# Patient Record
Sex: FEMALE | Race: WHITE | NOT HISPANIC OR LATINO | Employment: FULL TIME | URBAN - NONMETROPOLITAN AREA
[De-identification: names, ages, dates, MRNs, and addresses within clinical notes are randomized per-mention and may not be internally consistent; named-entity substitution may affect disease eponyms.]

---

## 2021-03-18 ENCOUNTER — IMMUNIZATION (OUTPATIENT)
Dept: VACCINE CLINIC | Facility: HOSPITAL | Age: 46
End: 2021-03-18

## 2021-03-18 PROCEDURE — 91300 HC SARSCOV02 VAC 30MCG/0.3ML IM: CPT | Performed by: THORACIC SURGERY (CARDIOTHORACIC VASCULAR SURGERY)

## 2021-03-18 PROCEDURE — 0001A: CPT | Performed by: THORACIC SURGERY (CARDIOTHORACIC VASCULAR SURGERY)

## 2021-04-08 ENCOUNTER — IMMUNIZATION (OUTPATIENT)
Dept: VACCINE CLINIC | Facility: HOSPITAL | Age: 46
End: 2021-04-08

## 2021-04-08 PROCEDURE — 91300 HC SARSCOV02 VAC 30MCG/0.3ML IM: CPT | Performed by: NURSE PRACTITIONER

## 2021-04-08 PROCEDURE — 0002A: CPT | Performed by: NURSE PRACTITIONER

## 2024-09-17 ENCOUNTER — OFFICE VISIT (OUTPATIENT)
Dept: OBSTETRICS AND GYNECOLOGY | Facility: CLINIC | Age: 49
End: 2024-09-17
Payer: COMMERCIAL

## 2024-09-17 VITALS
SYSTOLIC BLOOD PRESSURE: 126 MMHG | HEIGHT: 65 IN | WEIGHT: 142 LBS | DIASTOLIC BLOOD PRESSURE: 89 MMHG | BODY MASS INDEX: 23.66 KG/M2 | HEART RATE: 79 BPM

## 2024-09-17 DIAGNOSIS — Z01.419 ENCOUNTER FOR GYNECOLOGICAL EXAMINATION WITHOUT ABNORMAL FINDING: Primary | ICD-10-CM

## 2024-09-17 DIAGNOSIS — N90.89 VULVAR LESION: ICD-10-CM

## 2024-09-17 RX ORDER — PERFLUOROHEXYLOCTANE 1 MG/MG
SOLUTION OPHTHALMIC
COMMUNITY
Start: 2024-09-05

## 2024-09-17 RX ORDER — DOXYCYCLINE 100 MG/1
100 CAPSULE ORAL 2 TIMES DAILY
Qty: 20 CAPSULE | Refills: 0 | Status: SHIPPED | OUTPATIENT
Start: 2024-09-17 | End: 2024-09-27

## 2024-09-17 RX ORDER — FLURANDRENOLIDE 4 UG/CM2
TAPE TOPICAL
COMMUNITY
Start: 2024-08-29

## 2024-09-20 ENCOUNTER — PATIENT ROUNDING (BHMG ONLY) (OUTPATIENT)
Dept: OBSTETRICS AND GYNECOLOGY | Facility: CLINIC | Age: 49
End: 2024-09-20
Payer: COMMERCIAL

## 2024-09-20 ENCOUNTER — PATIENT MESSAGE (OUTPATIENT)
Dept: OBSTETRICS AND GYNECOLOGY | Facility: CLINIC | Age: 49
End: 2024-09-20
Payer: COMMERCIAL

## 2024-09-23 LAB
CYTOLOGIST CVX/VAG CYTO: NORMAL
CYTOLOGY CVX/VAG DOC CYTO: NORMAL
CYTOLOGY CVX/VAG DOC THIN PREP: NORMAL
DX ICD CODE: NORMAL
HPV I/H RISK 4 DNA CVX QL PROBE+SIG AMP: NEGATIVE
Lab: NORMAL
OTHER STN SPEC: NORMAL
STAT OF ADQ CVX/VAG CYTO-IMP: NORMAL

## 2024-10-02 ENCOUNTER — PROCEDURE VISIT (OUTPATIENT)
Dept: OBSTETRICS AND GYNECOLOGY | Facility: CLINIC | Age: 49
End: 2024-10-02
Payer: COMMERCIAL

## 2024-10-02 DIAGNOSIS — Z12.31 VISIT FOR SCREENING MAMMOGRAM: Primary | ICD-10-CM

## 2025-02-02 NOTE — PROGRESS NOTES
"GYN VISIT    Chief Complaint   Patient presents with    Follow-up     Here today for abnormal bleeding for 3 weeks and breast tenderness.        TOMI Horvath is a 49 y.o.  who presents with abnormal uterine bleeding and mastalgia. Her last mammogram was BI-RADS 2 in . She does report drinking caffeine. She reports that a few weeks ago she had menses for around 14 days. Then she had around 1 week with no bleeding, followed by another week of bleeding. She has not had anything like this previously. She does have a ParaGard IUD in place, which she has not had an issue with thus far.     LMP: No LMP recorded. Patient has had an implant.    Past Medical History:   Diagnosis Date    Migraine     Ovarian cyst     PONV (postoperative nausea and vomiting)     Urinary tract infection     Varicella         Past Surgical History:   Procedure Laterality Date    D & C WITH SUCTION      FOOT SURGERY Right     TONSILLECTOMY      WISDOM TOOTH EXTRACTION          Review of Systems   Constitutional:  Negative for chills, diaphoresis, fatigue and fever.   Genitourinary:  Positive for breast pain and menstrual problem. Negative for decreased urine volume, difficulty urinating, dyspareunia, flank pain, frequency, genital sores, hematuria, urgency, urinary incontinence, vaginal bleeding, vaginal discharge and vaginal pain.   Hematological:  Negative for adenopathy.   All other systems reviewed and are negative.      OBJECTIVE     Vitals:    25 1306   BP: 120/80   Weight: 64.4 kg (142 lb)   Height: 165.1 cm (65\")        Physical Exam  Constitutional:       General: She is awake.      Appearance: Normal appearance. She is well-developed and well-groomed.   Genitourinary:      Genitourinary Comments: Breasts are dense bilaterally without any palpable masses.       IUD strings visualized (Appropriate length and placement.).   Breasts:     Right: Breast implant present. No mass.      Left: Breast implant " present. No mass.   HENT:      Head: Normocephalic and atraumatic.   Pulmonary:      Effort: Pulmonary effort is normal.   Musculoskeletal:      Cervical back: Normal range of motion.   Lymphadenopathy:      Upper Body:      Right upper body: No supraclavicular or axillary adenopathy.      Left upper body: No supraclavicular or axillary adenopathy.   Neurological:      General: No focal deficit present.      Mental Status: She is alert and oriented to person, place, and time.   Skin:     General: Skin is warm and dry.   Psychiatric:         Mood and Affect: Mood normal.         Behavior: Behavior normal. Behavior is cooperative.   Vitals reviewed.         ASSESSMENT/PLAN    Diagnoses and all orders for this visit:    1. Abnormal uterine bleeding (AUB) (Primary)  -     POC Urinalysis Dipstick  -     POC Pregnancy, Urine  -     US Non-ob Transvaginal; Future  -     NuSwab VG+ - Swab, Vagina    2. Mastalgia  -     vitamin E 400 UNIT capsule; Take 1 capsule by mouth Daily.  Dispense: 30 capsule; Refill: 11  -     Mammo Diagnostic Digital Tomosynthesis Bilateral With CAD; Future  -     US Breast Right Limited; Future  -     US Breast Left Limited; Future    Education provided on caffeine and its impact on breast tissue. Recommended reducing caffeine if possible. Imaging ordered for breasts.   She will return to care for ultrasound and office visit to verify IUD placement.   Briefly discussed other options, such as uterine ablation.     Return in about 2 weeks (around 2/17/2025) for ultrasound + office visit.    I spent 30 minutes caring for Yuliet on this date of service. This time includes time spent by me in the following activities: preparing for the visit, reviewing tests, performing a medically appropriate examination and/or evaluation, counseling and educating the patient/family/caregiver, referring and communicating with other health care professionals, documenting information in the medical record, independently  interpreting results and communicating that information with the patient/family/caregiver, care coordination, ordering medications, ordering test(s), ordering procedure(s), obtaining a separately obtained history, and reviewing a separately obtained history.    Danica Reynolds CNM  2/3/2025  13:51 EST

## 2025-02-03 ENCOUNTER — OFFICE VISIT (OUTPATIENT)
Dept: OBSTETRICS AND GYNECOLOGY | Facility: CLINIC | Age: 50
End: 2025-02-03
Payer: COMMERCIAL

## 2025-02-03 VITALS
SYSTOLIC BLOOD PRESSURE: 120 MMHG | DIASTOLIC BLOOD PRESSURE: 80 MMHG | HEIGHT: 65 IN | BODY MASS INDEX: 23.66 KG/M2 | WEIGHT: 142 LBS

## 2025-02-03 DIAGNOSIS — N64.4 MASTALGIA: ICD-10-CM

## 2025-02-03 DIAGNOSIS — N93.9 ABNORMAL UTERINE BLEEDING (AUB): Primary | ICD-10-CM

## 2025-02-03 LAB
B-HCG UR QL: NEGATIVE
BILIRUB BLD-MCNC: NEGATIVE MG/DL
EXPIRATION DATE: NORMAL
GLUCOSE UR STRIP-MCNC: NEGATIVE MG/DL
INTERNAL NEGATIVE CONTROL: NEGATIVE
INTERNAL POSITIVE CONTROL: POSITIVE
KETONES UR QL: NEGATIVE
LEUKOCYTE EST, POC: NEGATIVE
Lab: NORMAL
NITRITE UR-MCNC: NEGATIVE MG/ML
PROT UR STRIP-MCNC: NEGATIVE MG/DL
RBC # UR STRIP: NEGATIVE /UL

## 2025-02-03 RX ORDER — VITAMIN E 268 MG
400 CAPSULE ORAL DAILY
Qty: 30 CAPSULE | Refills: 11 | Status: SHIPPED | OUTPATIENT
Start: 2025-02-03

## 2025-02-06 LAB
A VAGINAE DNA VAG QL NAA+PROBE: NORMAL SCORE
BVAB2 DNA VAG QL NAA+PROBE: NORMAL SCORE
C ALBICANS DNA VAG QL NAA+PROBE: NEGATIVE
C GLABRATA DNA VAG QL NAA+PROBE: NEGATIVE
C TRACH DNA SPEC QL NAA+PROBE: NEGATIVE
MEGA1 DNA VAG QL NAA+PROBE: NORMAL SCORE
N GONORRHOEA DNA VAG QL NAA+PROBE: NEGATIVE
T VAGINALIS DNA VAG QL NAA+PROBE: NEGATIVE

## 2025-02-07 DIAGNOSIS — N64.4 MASTALGIA: Primary | ICD-10-CM

## 2025-02-11 ENCOUNTER — TELEPHONE (OUTPATIENT)
Dept: OBSTETRICS AND GYNECOLOGY | Facility: CLINIC | Age: 50
End: 2025-02-11
Payer: COMMERCIAL

## 2025-02-11 NOTE — TELEPHONE ENCOUNTER
Called pt back to explain what appts were needed.  She scheduled at [facility] for 2/18/25 @ 145 & 215pm for DX Bilat Mammo & Bilat Limited US.

## 2025-02-18 ENCOUNTER — OFFICE VISIT (OUTPATIENT)
Dept: OBSTETRICS AND GYNECOLOGY | Facility: CLINIC | Age: 50
End: 2025-02-18
Payer: COMMERCIAL

## 2025-02-18 ENCOUNTER — APPOINTMENT (OUTPATIENT)
Dept: WOMENS IMAGING | Facility: HOSPITAL | Age: 50
End: 2025-02-18
Payer: COMMERCIAL

## 2025-02-18 VITALS
WEIGHT: 142 LBS | BODY MASS INDEX: 23.66 KG/M2 | HEIGHT: 65 IN | SYSTOLIC BLOOD PRESSURE: 135 MMHG | DIASTOLIC BLOOD PRESSURE: 85 MMHG

## 2025-02-18 DIAGNOSIS — N93.9 ABNORMAL UTERINE BLEEDING (AUB): Primary | ICD-10-CM

## 2025-02-18 DIAGNOSIS — Z30.431 IUD CHECK UP: ICD-10-CM

## 2025-02-18 PROCEDURE — G0279 TOMOSYNTHESIS, MAMMO: HCPCS | Performed by: RADIOLOGY

## 2025-02-18 PROCEDURE — 77066 DX MAMMO INCL CAD BI: CPT | Performed by: RADIOLOGY

## 2025-02-18 PROCEDURE — 77062 BREAST TOMOSYNTHESIS BI: CPT | Performed by: RADIOLOGY

## 2025-02-18 PROCEDURE — 76642 ULTRASOUND BREAST LIMITED: CPT | Performed by: RADIOLOGY

## 2025-02-18 NOTE — PROGRESS NOTES
"GYN VISIT    Chief Complaint   Patient presents with    Follow-up     Here today for U/S for paragard check        SUBJECTIVE    Yuliet is a 49 y.o.  who presents today for an ultrasound and office visit to assess the position of her ParaGard IUD.     LMP: No LMP recorded. Patient has had an implant.    Past Medical History:   Diagnosis Date    Migraine     Ovarian cyst     PONV (postoperative nausea and vomiting)     Urinary tract infection     Varicella         Past Surgical History:   Procedure Laterality Date    D & C WITH SUCTION      FOOT SURGERY Right     TONSILLECTOMY      WISDOM TOOTH EXTRACTION          Review of Systems   Constitutional:  Negative for chills, diaphoresis, fatigue and fever.   Genitourinary:  Negative for decreased urine volume, difficulty urinating, dyspareunia, flank pain, frequency, genital sores, hematuria, menstrual problem, urgency, urinary incontinence, vaginal bleeding, vaginal discharge and vaginal pain.   All other systems reviewed and are negative.      OBJECTIVE     Vitals:    25 1114   BP: 135/85   Weight: 64.4 kg (142 lb)   Height: 165.1 cm (65\")        Physical Exam  Constitutional:       General: She is awake.      Appearance: Normal appearance. She is well-developed and well-groomed.   HENT:      Head: Normocephalic and atraumatic.   Pulmonary:      Effort: Pulmonary effort is normal.   Musculoskeletal:      Cervical back: Normal range of motion.   Neurological:      General: No focal deficit present.      Mental Status: She is alert and oriented to person, place, and time.   Skin:     General: Skin is warm and dry.   Psychiatric:         Mood and Affect: Mood normal.         Behavior: Behavior normal. Behavior is cooperative.   Vitals reviewed.         ASSESSMENT/PLAN    Diagnoses and all orders for this visit:    1. Abnormal uterine bleeding (AUB) (Primary)    2. IUD check up    Reviewed and discussed TVUS result - see preliminary read below. "     Return for annual exam or as needed before.    I spent 30 minutes caring for Yuliet on this date of service. This time includes time spent by me in the following activities: preparing for the visit, reviewing tests, performing a medically appropriate examination and/or evaluation, counseling and educating the patient/family/caregiver, referring and communicating with other health care professionals, documenting information in the medical record, independently interpreting results and communicating that information with the patient/family/caregiver, care coordination, ordering medications, ordering test(s), ordering procedure(s), obtaining a separately obtained history, and reviewing a separately obtained history.    Danica Reynolds CNM  2/19/2025  21:19 EST    PRELIMINARY ULTRASOUND RESULT  Study: Transvaginal pelvic ultrasound  Findings:  Uterus measures 8.88 x 5.30 x 4.69 cm, volume 115.574 cm3 , retroverted  There are two  fibroids identified., measuring 2.31 x 1.52 x 1.91 cm, volume 2.781 cm3 and 3.25 x 2.80 x 3.03 cm, volume 13.400 cm3  Endometrial thickness measures 0.66 cm, and is homogenous appearance without evidence of polyps.  Cervix is normal-appearing.  Left ovary:  3.78 x 1.83 x 2.19 cm, volume 7.932 cm3  There is a left complex ovarian cyst identified measuring 2.29 x 1.69 x 1.99 cm, volume 3.435 cm3  Right ovary: 2.71 x 1.80 x 1.46 cm, volume 3.729 cm3  There is no adnexal mass or cyst identified.  There is no free fluid.  IUD is seen in place.   Comparative data: not available for examination  Danica Reynolds CNM  2/18/2025  12:22 EST

## 2025-02-20 DIAGNOSIS — N64.4 MASTALGIA: Primary | ICD-10-CM

## 2025-03-06 ENCOUNTER — OFFICE VISIT (OUTPATIENT)
Dept: OBSTETRICS AND GYNECOLOGY | Facility: CLINIC | Age: 50
End: 2025-03-06
Payer: COMMERCIAL

## 2025-03-06 VITALS
BODY MASS INDEX: 23.16 KG/M2 | HEART RATE: 82 BPM | WEIGHT: 139 LBS | HEIGHT: 65 IN | SYSTOLIC BLOOD PRESSURE: 126 MMHG | DIASTOLIC BLOOD PRESSURE: 88 MMHG

## 2025-03-06 DIAGNOSIS — D25.1 INTRAMURAL AND SUBSEROUS LEIOMYOMA OF UTERUS: Primary | ICD-10-CM

## 2025-03-06 DIAGNOSIS — D25.2 INTRAMURAL AND SUBSEROUS LEIOMYOMA OF UTERUS: Primary | ICD-10-CM

## 2025-03-06 RX ORDER — NEEDLES, FILTER 19GX1 1/2"
NEEDLE, DISPOSABLE MISCELLANEOUS
COMMUNITY
Start: 2025-01-13

## 2025-03-06 NOTE — PROGRESS NOTES
"Subjective    is a 49 y.o. female following up from US for AUB.     Chief Complaint   Patient presents with    Follow-up        HPI    49 y.o.    Follow up of recent ultrasound showing Fibroids   To discuss findings.     Review of Systems   Constitutional:  Negative for fever.   Gastrointestinal:  Negative for abdominal pain.   Genitourinary:  Negative for menstrual problem, pelvic pain and vaginal bleeding.        Objective   /88   Pulse 82   Ht 165.1 cm (65\")   Wt 63 kg (139 lb)   BMI 23.13 kg/m²   Physical Exam  Constitutional:       General: She is not in acute distress.     Appearance: Normal appearance. She is well-developed and normal weight.   Neck:      Thyroid: No thyromegaly.   Pulmonary:      Effort: No respiratory distress.   Abdominal:      General: Abdomen is flat. There is no distension.      Palpations: Abdomen is soft.      Tenderness: There is no abdominal tenderness.   Musculoskeletal:      Right lower leg: No edema.      Left lower leg: No edema.   Neurological:      Mental Status: She is alert and oriented to person, place, and time.   Skin:     General: Skin is warm and dry.   Psychiatric:         Behavior: Behavior normal.         Thought Content: Thought content normal.         Judgment: Judgment normal.   Vitals reviewed.        GYN scan   Mildly enlarged uterus   Two posterior fibroids, intramural to subserosal   Larges 3+ cm   Normal ovaries.   IUD in place     Assessment/Plan   Diagnoses and all orders for this visit:    1. Intramural and subserous leiomyoma of uterus (Primary)    Reviewed fibroids in general   Discussed how they cause symptoms   Concerns like excessive bleeding or pelvic pain   Now without symptoms   So okay to follow clinically for worsening symptoms   Re-evaluate PRN     Discussed intermediary treatment options in general, but she is very interested in not doing anything, which I would agree with for now     Amrand Gupta MD   3/6/2025  15:08 EST "

## 2025-07-01 ENCOUNTER — OFFICE VISIT (OUTPATIENT)
Dept: ORTHOPEDIC SURGERY | Facility: CLINIC | Age: 50
End: 2025-07-01
Payer: COMMERCIAL

## 2025-07-01 VITALS — WEIGHT: 143.7 LBS | TEMPERATURE: 98.2 F | BODY MASS INDEX: 23.94 KG/M2 | HEIGHT: 65 IN

## 2025-07-01 DIAGNOSIS — M75.41 ROTATOR CUFF IMPINGEMENT SYNDROME OF RIGHT SHOULDER: Primary | ICD-10-CM

## 2025-07-01 DIAGNOSIS — M25.511 ACUTE PAIN OF RIGHT SHOULDER: ICD-10-CM

## 2025-07-01 PROCEDURE — 20610 DRAIN/INJ JOINT/BURSA W/O US: CPT | Performed by: NURSE PRACTITIONER

## 2025-07-01 PROCEDURE — 99213 OFFICE O/P EST LOW 20 MIN: CPT | Performed by: NURSE PRACTITIONER

## 2025-07-01 PROCEDURE — 73030 X-RAY EXAM OF SHOULDER: CPT | Performed by: NURSE PRACTITIONER

## 2025-07-01 RX ORDER — LIDOCAINE HYDROCHLORIDE 10 MG/ML
2 INJECTION, SOLUTION EPIDURAL; INFILTRATION; INTRACAUDAL; PERINEURAL
Status: COMPLETED | OUTPATIENT
Start: 2025-07-01 | End: 2025-07-01

## 2025-07-01 RX ORDER — METHYLPREDNISOLONE ACETATE 80 MG/ML
80 INJECTION, SUSPENSION INTRA-ARTICULAR; INTRALESIONAL; INTRAMUSCULAR; SOFT TISSUE
Status: COMPLETED | OUTPATIENT
Start: 2025-07-01 | End: 2025-07-01

## 2025-07-01 RX ADMIN — LIDOCAINE HYDROCHLORIDE 2 ML: 10 INJECTION, SOLUTION EPIDURAL; INFILTRATION; INTRACAUDAL; PERINEURAL at 14:41

## 2025-07-01 RX ADMIN — METHYLPREDNISOLONE ACETATE 80 MG: 80 INJECTION, SUSPENSION INTRA-ARTICULAR; INTRALESIONAL; INTRAMUSCULAR; SOFT TISSUE at 14:41

## 2025-07-01 NOTE — PROGRESS NOTES
[facility]              New Problem      Patient Name: Yuliet Mcgee  : 1975  Primary Care Physician: Obed Billingsley APRN        Chief Complaint:  Right shoulder pain    HPI:   Yuliet Mcgee is a 49 y.o. year old who presents today for evaluation.  History of Present Illness  The patient presents for evaluation of right shoulder pain.    She has been experiencing discomfort in her right shoulder for several weeks, which intensified approximately 1.5 weeks ago. She was last seen in the ER over the weekend due to concerns about a bright red, warm spot on her shoulder, which she feared might be a blood clot. The ER visit confirmed it was not a blood clot, and she suspects it might have been a freezer burn from excessive icing, but the spot has since disappeared. She was prescribed muscle relaxers, which provided some relief, but she is unable to take them while driving or working.    She reports that as a  at [employer], she relies heavily on her right arm for various tasks such as pulling, pushing, and reaching overhead and behind. Despite attempts to alleviate the pain through rest, ice, heat, cupping, dry needling, and stretching, there has been no noticeable improvement. The pain has disrupted her sleep and has spread to her scapula. She reports no numbness or tingling sensations. She admits to slouching frequently during flights and often has to reach down and across for various tasks. 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 She has been taking ibuprofen twice daily but is cautious due to previous gastrointestinal issues.    SOCIAL HISTORY  Occupations: Works as a  at [employer].    Past Medical/Surgical, Social and Family History:  I have reviewed and/or updated pertinent history as noted in the medical record including:  Past Medical History:   Diagnosis Date    Migraine     Ovarian cyst     PONV (postoperative nausea and vomiting)      "Urinary tract infection     Varicella      Past Surgical History:   Procedure Laterality Date    D & C WITH SUCTION  2007    FOOT SURGERY Right     TONSILLECTOMY      WISDOM TOOTH EXTRACTION  1994     Social History     Occupational History    Not on file   Tobacco Use    Smoking status: Never    Smokeless tobacco: Not on file   Vaping Use    Vaping status: Never Used   Substance and Sexual Activity    Alcohol use: Yes     Alcohol/week: 2.0 standard drinks of alcohol     Types: 2 Glasses of wine per week    Drug use: Never    Sexual activity: Yes     Partners: Male     Birth control/protection: I.U.D.          Allergies:   Allergies   Allergen Reactions    Amoxicillin Nausea And Vomiting    Iodine Rash     Other reaction(s): Unknown       Medications:   Home Medications:  Current Outpatient Medications on File Prior to Visit   Medication Sig    BD Integra Syringe 25G X 1\" 3 ML misc USE 1 AS DIRECTED EVERY 2 WEEKS WITH VITAMIN B12 INJECTION    cyanocobalamin 1000 MCG/ML injection INJECT 1 ML EVERY 2 WEEKS    Miebo 1.338 GM/ML solution     Paragard Intrauterine Copper intrauterine device IUD To be inserted one time by prescriber. Route intrauterine.    zolpidem (AMBIEN) 10 MG tablet Take 1 tablet by mouth At Night As Needed.     No current facility-administered medications on file prior to visit.         ROS:  ROS negative except as listed in the HPI.    Physical Exam:   49 y.o. female  Body mass index is 23.91 kg/m²., 65.2 kg (143 lb 11.2 oz)  Vitals:    07/01/25 1422   Temp: 98.2 °F (36.8 °C)     General: Alert, cooperative, appears well and in no observable distress. Appears stated age and BMI as listed above.  HEENT: Normocephalic, atraumatic on external visual inspection.  CV: No significant peripheral edema.  Respiratory: Normal respiratory effort.  Skin: Warm & well perfused; appropriate skin turgor.  Psych: Appropriate mood & affect.  Neuro: Gross sensation and motor intact in affected " extremity/extremities.  Vascular: Peripheral pulses palpable in affected extremity/extremities.   Physical Exam  Musculoskeletal:  Right shoulder: Pain on movement, tenderness on palpation, limited range of motion, pain on resisted external rotation, and pain on resisted abduction.    MSK Exam:    Right Shoulder  No obvious deformities or wounds.   No redness or significant swelling.  +Definitive painful arc.  +Impingement signs  +Neer Test  +Hawkin's Sign  -Empty Can  +Hornblower  Flexion 170  ER 50  IR lumbar spine  Strength is 4+/5 strength with isometric testing of the rotator cuff and painful    Left Shoulder  No deformity or wounds.  No redness or swelling.  No tenderness to palpation.  Full, painless AROM.  Cuff Strength 4+ to 5/5 with isometric testing of the rotator cuff.  Negative provocative testing.    Brief examination of the cervical spine did not reproduce any specific radicular pattern or symptoms.   Strength is reasonable and symmetric in the upper extremities.       Radiology:    Results  Imaging   - X-ray of the right shoulder: No abnormalities or signs of arthritis    The following X-rays were ordered/reviewed today to evaluate the patient's symptoms: Shoulder: AP, Scapular Y and Axillary Lateral of right shoulder(s) show no obvious acute or chronic pathology. Humeral head is well seated in the glenoid, SA interval is reasonable. No significant degenerative changes are appreciated.    Procedure:   See Procedure Note: The potential risks and benefits of performing a diagnostic and therapeutic injection were discussed with the patient prior to procedure. Risks include, but are not limited to infection, swelling, transient increase in pain, bleeding, bruising. Patient was advised that injections are a diagnostic and therapeutic tool meaning they may not alleviate symptoms at all, or may only provide partial or temporary relief. Injection precautions and aftercare discussed.      Misc. Data/Labs:  N/A    Assessment & Plan:      ICD-10-CM ICD-9-CM   1. Rotator cuff impingement syndrome of right shoulder  M75.41 726.10   2. Acute pain of right shoulder  M25.511 719.41     No orders of the defined types were placed in this encounter.    Orders Placed This Encounter   Procedures    Large Joint Arthrocentesis: R subacromial bursa    XR Shoulder 2+ View Right    Ambulatory Referral to Physical Therapy for Evaluation & Treatment       Assessment & Plan  1. Right shoulder pain:  The symptoms suggest a possible issue with the rotator cuff- likely impingement or bursitis. The x-ray results are normal, showing no signs of arthritis. The pain could be due to nerve involvement or compensation for a painful shoulder, causing discomfort around the scapula- this remains in the differential as a source of her pain.     A steroid injection will be administered into the rotator cuff area to alleviate the inflammation and provide relief. Ibuprofen 2 pills twice daily should be continued for the next 5 to 7 days until the effects of the injection are felt. Aggressive stretching is discouraged; instead, gentle motion exercises such as wall walks are recommended. Avoid immobilizing the shoulder completely to prevent a frozen shoulder. When lifting or maneuvering, keep the object close to the body and avoid reaching or pulling from a distance. Both ice and heat can be used for relief, depending on what feels better. A referral for physical therapy at [facility] will be made. If there is no significant improvement in the next couple of weeks, an MRI may be considered for a closer examination. We also talked about adding some topical voltaren to her regimen to help with anti-inflammatory benefits given her limitations with oral NSAIDS.    PROCEDURE  Steroid injection was administered in the right shoulder today.    Continue with activity modifications as needed/discussed.  Continue ICE and/or HEAT PRN.  Recommend to  continue activity as tolerated, focus on stretching and strengthening of the joint.    Focus on posture and body mechanics to improve/prevent symptoms.   Patient encouraged to call with questions or concerns prior to follow up.  Will discuss with attending as needed.  Consider additional referrals, work up and/or advanced imaging as indicated or if patient fails to respond to conservative care.    Return in about 4 weeks (around 7/29/2025) for Recheck. If symptoms change call for sooner appt..    Patient or patient representative verbalized consent for the use of Ambient Listening during the visit with  BIRGIT Agustin for chart documentation. 7/1/2025  14:52 EDT     BIRGIT Leal    Dictation software was used to complete a portion or all of this note.    Large Joint Arthrocentesis: R subacromial bursa  Date/Time: 7/1/2025 2:41 PM  Consent given by: patient  Site marked: site marked  Timeout: Immediately prior to procedure a time out was called to verify the correct patient, procedure, equipment, support staff and site/side marked as required   Supporting Documentation  Indications: pain   Procedure Details  Location: shoulder - R subacromial bursa  Preparation: Patient was prepped and draped in the usual sterile fashion  Needle gauge: 21G.  Approach: posterior  Medications administered: 80 mg methylPREDNISolone acetate 80 MG/ML; 2 mL lidocaine PF 1% 1 %  Patient tolerance: patient tolerated the procedure well with no immediate complications

## 2025-08-05 ENCOUNTER — OFFICE VISIT (OUTPATIENT)
Dept: ORTHOPEDIC SURGERY | Facility: CLINIC | Age: 50
End: 2025-08-05
Payer: COMMERCIAL

## 2025-08-05 VITALS — WEIGHT: 142.6 LBS | TEMPERATURE: 98.2 F | BODY MASS INDEX: 23.76 KG/M2 | HEIGHT: 65 IN

## 2025-08-05 DIAGNOSIS — M75.41 ROTATOR CUFF IMPINGEMENT SYNDROME OF RIGHT SHOULDER: Primary | ICD-10-CM

## 2025-08-05 DIAGNOSIS — M25.511 ACUTE PAIN OF RIGHT SHOULDER: ICD-10-CM
